# Patient Record
Sex: FEMALE | Race: WHITE | URBAN - METROPOLITAN AREA
[De-identification: names, ages, dates, MRNs, and addresses within clinical notes are randomized per-mention and may not be internally consistent; named-entity substitution may affect disease eponyms.]

---

## 2019-03-12 ENCOUNTER — Encounter (OUTPATIENT)
Dept: URBAN - METROPOLITAN AREA CLINIC 71 | Facility: CLINIC | Age: 74
End: 2019-03-12
Payer: MEDICARE

## 2019-03-12 PROCEDURE — 99204 OFFICE O/P NEW MOD 45 MIN: CPT | Performed by: OPHTHALMOLOGY

## 2019-04-09 ENCOUNTER — APPOINTMENT (OUTPATIENT)
Age: 74
Setting detail: DERMATOLOGY
End: 2019-07-09

## 2019-04-09 DIAGNOSIS — Z48.89 ENCOUNTER FOR OTHER SPECIFIED SURGICAL AFTERCARE: ICD-10-CM

## 2019-04-09 PROCEDURE — OTHER TREATMENT REGIMEN: OTHER

## 2019-04-09 PROCEDURE — OTHER COUNSELING - POST-OP CHECK, AUGMENTATION MASTOPEXY: OTHER

## 2019-04-09 ASSESSMENT — LOCATION DETAILED DESCRIPTION DERM
LOCATION DETAILED: LEFT LATERAL BREAST 5-6:00 REGION
LOCATION DETAILED: RIGHT LATERAL BREAST 6-7:00 REGION

## 2019-04-09 ASSESSMENT — LOCATION SIMPLE DESCRIPTION DERM
LOCATION SIMPLE: LEFT BREAST
LOCATION SIMPLE: RIGHT BREAST

## 2019-04-09 ASSESSMENT — LOCATION ZONE DERM: LOCATION ZONE: TRUNK

## 2019-04-09 NOTE — HPI: POST-OP CHECK, BREAST
History: S/p bilateral breast augmentation and mastopexy. Presents today with a blister under the left breast and inframammary scar is irritated and tender to the touch.

## 2019-11-05 ENCOUNTER — APPOINTMENT (OUTPATIENT)
Age: 74
Setting detail: DERMATOLOGY
End: 2019-11-06

## 2019-11-05 VITALS
WEIGHT: 136.4 LBS | SYSTOLIC BLOOD PRESSURE: 135 MMHG | HEART RATE: 67 BPM | HEIGHT: 63 IN | DIASTOLIC BLOOD PRESSURE: 78 MMHG | RESPIRATION RATE: 17 BRPM

## 2019-11-05 DIAGNOSIS — Z48.89 ENCOUNTER FOR OTHER SPECIFIED SURGICAL AFTERCARE: ICD-10-CM

## 2019-11-05 PROCEDURE — 99024 POSTOP FOLLOW-UP VISIT: CPT

## 2019-11-05 PROCEDURE — OTHER COUNSELING - POST-OP CHECK, BREAST IMPLANT EXCHANGE: OTHER

## 2019-11-05 PROCEDURE — OTHER CODE 99024 - NO CHARGE POST-OP VISIT: OTHER

## 2019-11-05 NOTE — HPI: POST-OP CHECK, BREAST
History: Surgery at [facility] 06/13/18 S/p bilateral breast implant exchange, Pt states there’s a bubble/lump under left breast. No breast pain.

## 2024-12-03 ENCOUNTER — OFFICE VISIT (OUTPATIENT)
Dept: URBAN - METROPOLITAN AREA CLINIC 71 | Facility: CLINIC | Age: 79
End: 2024-12-03
Payer: MEDICARE

## 2024-12-03 DIAGNOSIS — H04.123 DRY EYE SYNDROME OF BILATERAL LACRIMAL GLANDS: ICD-10-CM

## 2024-12-03 DIAGNOSIS — Z96.1 PRESENCE OF INTRAOCULAR LENS: ICD-10-CM

## 2024-12-03 DIAGNOSIS — H43.813 VITREOUS DEGENERATION, BILATERAL: ICD-10-CM

## 2024-12-03 DIAGNOSIS — H35.54 DYSTROPHIES PRIMARILY W THE RETINAL PIGMENT EPITHELIUM: Primary | ICD-10-CM

## 2024-12-03 DIAGNOSIS — H52.4 PRESBYOPIA: ICD-10-CM

## 2024-12-03 PROCEDURE — 99203 OFFICE O/P NEW LOW 30 MIN: CPT | Performed by: OPTOMETRIST

## 2024-12-03 PROCEDURE — 92134 CPTRZ OPH DX IMG PST SGM RTA: CPT | Performed by: OPTOMETRIST

## 2024-12-03 PROCEDURE — 92133 CPTRZD OPH DX IMG PST SGM ON: CPT | Performed by: OPTOMETRIST

## 2024-12-03 ASSESSMENT — VISUAL ACUITY
OS: 20/150
OD: 20/20

## 2024-12-03 ASSESSMENT — INTRAOCULAR PRESSURE
OS: 16
OD: 11

## 2025-08-07 ENCOUNTER — OFFICE VISIT (OUTPATIENT)
Dept: URBAN - METROPOLITAN AREA CLINIC 75 | Facility: CLINIC | Age: 80
End: 2025-08-07
Payer: MEDICARE

## 2025-08-07 DIAGNOSIS — H53.123 TRANSIENT VISUAL LOSS, BILATERAL: ICD-10-CM

## 2025-08-07 DIAGNOSIS — H04.123 DRY EYE SYNDROME OF BILATERAL LACRIMAL GLANDS: ICD-10-CM

## 2025-08-07 DIAGNOSIS — H40.013 OPEN ANGLE WITH BORDERLINE FINDINGS, LOW RISK, BILATERAL: ICD-10-CM

## 2025-08-07 DIAGNOSIS — H35.54 DYSTROPHIES PRIMARILY INVOLVING THE RETINAL PIGMENT EPITHELIUM: Primary | ICD-10-CM

## 2025-08-07 PROCEDURE — 92133 CPTRZD OPH DX IMG PST SGM ON: CPT | Performed by: OPTOMETRIST

## 2025-08-07 PROCEDURE — 99214 OFFICE O/P EST MOD 30 MIN: CPT | Performed by: OPTOMETRIST

## 2025-08-07 PROCEDURE — 92134 CPTRZ OPH DX IMG PST SGM RTA: CPT | Performed by: OPTOMETRIST

## 2025-08-07 ASSESSMENT — INTRAOCULAR PRESSURE
OS: 17
OD: 16